# Patient Record
Sex: MALE | Race: OTHER
[De-identification: names, ages, dates, MRNs, and addresses within clinical notes are randomized per-mention and may not be internally consistent; named-entity substitution may affect disease eponyms.]

---

## 2019-12-25 ENCOUNTER — HOSPITAL ENCOUNTER (EMERGENCY)
Dept: HOSPITAL 12 - ER | Age: 79
Discharge: HOME | End: 2019-12-25
Payer: MEDICARE

## 2019-12-25 VITALS — HEIGHT: 70 IN | BODY MASS INDEX: 24.77 KG/M2 | WEIGHT: 173 LBS

## 2019-12-25 VITALS — DIASTOLIC BLOOD PRESSURE: 79 MMHG | SYSTOLIC BLOOD PRESSURE: 141 MMHG

## 2019-12-25 DIAGNOSIS — I10: ICD-10-CM

## 2019-12-25 DIAGNOSIS — N40.1: Primary | ICD-10-CM

## 2019-12-25 DIAGNOSIS — E78.5: ICD-10-CM

## 2019-12-25 DIAGNOSIS — R39.15: ICD-10-CM

## 2019-12-25 DIAGNOSIS — R31.9: ICD-10-CM

## 2019-12-25 DIAGNOSIS — Z90.49: ICD-10-CM

## 2019-12-25 LAB
APPEARANCE UR: (no result)
BASOPHILS # BLD AUTO: 0 K/UL (ref 0–8)
BASOPHILS NFR BLD AUTO: 0.2 % (ref 0–2)
BILIRUB UR QL STRIP: NEGATIVE
BUN SERPL-MCNC: 8 MG/DL (ref 7–18)
CHLORIDE SERPL-SCNC: 97 MMOL/L (ref 98–107)
CO2 SERPL-SCNC: 29 MMOL/L (ref 21–32)
COLOR UR: (no result)
CREAT SERPL-MCNC: 0.9 MG/DL (ref 0.6–1.3)
EOSINOPHIL # BLD AUTO: 0.2 K/UL (ref 0–0.7)
EOSINOPHIL NFR BLD AUTO: 2 % (ref 0–7)
GLUCOSE SERPL-MCNC: 107 MG/DL (ref 74–106)
GLUCOSE UR STRIP-MCNC: NEGATIVE MG/DL
HCT VFR BLD AUTO: 46.6 % (ref 36.7–47.1)
HGB BLD-MCNC: 15.7 G/DL (ref 12.5–16.3)
HGB UR QL STRIP: (no result)
KETONES UR STRIP-MCNC: NEGATIVE MG/DL
LEUKOCYTE ESTERASE UR QL STRIP: NEGATIVE
LYMPHOCYTES # BLD AUTO: 0.7 K/UL (ref 20–40)
LYMPHOCYTES NFR BLD AUTO: 7 % (ref 20.5–51.5)
MCH RBC QN AUTO: 29.4 UUG (ref 23.8–33.4)
MCHC RBC AUTO-ENTMCNC: 34 G/DL (ref 32.5–36.3)
MCV RBC AUTO: 87.4 FL (ref 73–96.2)
MONOCYTES # BLD AUTO: 0.7 K/UL (ref 2–10)
MONOCYTES NFR BLD AUTO: 7.2 % (ref 0–11)
NEUTROPHILS # BLD AUTO: 7.8 K/UL (ref 1.8–8.9)
NEUTROPHILS NFR BLD AUTO: 83.6 % (ref 38.5–71.5)
NITRITE UR QL STRIP: NEGATIVE
PH UR STRIP: 7 [PH] (ref 5–8)
PLATELET # BLD AUTO: 189 K/UL (ref 152–348)
POTASSIUM SERPL-SCNC: 4 MMOL/L (ref 3.5–5.1)
RBC # BLD AUTO: 5.34 MIL/UL (ref 4.06–5.63)
RBC #/AREA URNS HPF: (no result) /HPF (ref 0–3)
SP GR UR STRIP: 1.01 (ref 1–1.03)
UROBILINOGEN UR STRIP-MCNC: 0.2 E.U./DL
WBC # BLD AUTO: 9.3 K/UL (ref 3.6–10.2)
WBC #/AREA URNS HPF: (no result) /HPF (ref 0–3)

## 2019-12-25 PROCEDURE — A4663 DIALYSIS BLOOD PRESSURE CUFF: HCPCS

## 2019-12-25 PROCEDURE — A9150 MISC/EXPER NON-PRESCRIPT DRU: HCPCS

## 2019-12-25 NOTE — NUR
Patient discharged to home in stable conditon.  Written and verbal after care 
instructions given. 

Patient verbalizes understanding of instructions.copy of all the images 
provided for pt and daughter and pt and daughter understand that the pt needs 
urology follow up. translation provided by me from farsi to english. pt walks i 
nsteady gait. pt deneis any pressure abdomial area.

## 2019-12-25 NOTE — NUR
Pt was evaluated by dr Landers. pt was d/c'd to home. d/c instructions given to 
the pt by dr Landers.

## 2022-07-28 ENCOUNTER — HOSPITAL ENCOUNTER (INPATIENT)
Dept: HOSPITAL 54 - ER | Age: 82
LOS: 1 days | Discharge: LEFT BEFORE DISCHARGE | DRG: 178 | End: 2022-07-29
Attending: INTERNAL MEDICINE | Admitting: NURSE PRACTITIONER
Payer: MEDICARE

## 2022-07-28 VITALS — BODY MASS INDEX: 21.47 KG/M2 | WEIGHT: 150 LBS | HEIGHT: 70 IN

## 2022-07-28 DIAGNOSIS — U07.1: Primary | ICD-10-CM

## 2022-07-28 DIAGNOSIS — E88.09: ICD-10-CM

## 2022-07-28 DIAGNOSIS — E83.51: ICD-10-CM

## 2022-07-28 DIAGNOSIS — I10: ICD-10-CM

## 2022-07-28 DIAGNOSIS — I95.9: ICD-10-CM

## 2022-07-28 DIAGNOSIS — E46: ICD-10-CM

## 2022-07-28 DIAGNOSIS — E86.0: ICD-10-CM

## 2022-07-28 DIAGNOSIS — E87.1: ICD-10-CM

## 2022-07-28 LAB
ALBUMIN SERPL BCP-MCNC: 3.2 G/DL (ref 3.4–5)
ALP SERPL-CCNC: 64 U/L (ref 46–116)
ALT SERPL W P-5'-P-CCNC: 9 U/L (ref 12–78)
AST SERPL W P-5'-P-CCNC: 19 U/L (ref 15–37)
BASOPHILS # BLD AUTO: 0 K/UL (ref 0–0.2)
BASOPHILS NFR BLD AUTO: 0.4 % (ref 0–2)
BILIRUB DIRECT SERPL-MCNC: 0.1 MG/DL (ref 0–0.2)
BILIRUB SERPL-MCNC: 0.4 MG/DL (ref 0.2–1)
BILIRUB UR QL STRIP: NEGATIVE
BUN SERPL-MCNC: 14 MG/DL (ref 7–18)
CALCIUM SERPL-MCNC: 7.5 MG/DL (ref 8.5–10.1)
CHLORIDE SERPL-SCNC: 100 MMOL/L (ref 98–107)
CO2 SERPL-SCNC: 30 MMOL/L (ref 21–32)
COLOR UR: YELLOW
CREAT SERPL-MCNC: 0.9 MG/DL (ref 0.6–1.3)
EOSINOPHIL NFR BLD AUTO: 0.3 % (ref 0–6)
GLUCOSE SERPL-MCNC: 101 MG/DL (ref 74–106)
GLUCOSE UR STRIP-MCNC: NEGATIVE MG/DL
HCT VFR BLD AUTO: 36 % (ref 39–51)
HGB BLD-MCNC: 12.1 G/DL (ref 13.5–17.5)
LEUKOCYTE ESTERASE UR QL STRIP: NEGATIVE
LYMPHOCYTES NFR BLD AUTO: 0.6 K/UL (ref 0.8–4.8)
LYMPHOCYTES NFR BLD AUTO: 19.2 % (ref 20–44)
LYMPHOCYTES NFR BLD MANUAL: 15 % (ref 16–48)
MCHC RBC AUTO-ENTMCNC: 33 G/DL (ref 31–36)
MCV RBC AUTO: 87 FL (ref 80–96)
MONOCYTES NFR BLD AUTO: 0.6 K/UL (ref 0.1–1.3)
MONOCYTES NFR BLD AUTO: 16.9 % (ref 2–12)
MONOCYTES NFR BLD MANUAL: 14 % (ref 0–11)
NEUTROPHILS # BLD AUTO: 2.1 K/UL (ref 1.8–8.9)
NEUTROPHILS NFR BLD AUTO: 63.2 % (ref 43–81)
NEUTS BAND NFR BLD MANUAL: 2 % (ref 0–5)
NEUTS SEG NFR BLD MANUAL: 69 % (ref 42–76)
NITRITE UR QL STRIP: NEGATIVE
PH UR STRIP: 5.5 [PH] (ref 5–8)
PLATELET # BLD AUTO: 171 K/UL (ref 150–450)
POTASSIUM SERPL-SCNC: 3.9 MMOL/L (ref 3.5–5.1)
PROT SERPL-MCNC: 6.4 G/DL (ref 6.4–8.2)
PROT UR QL STRIP: NEGATIVE MG/DL
RBC # BLD AUTO: 4.18 MIL/UL (ref 4.5–6)
SODIUM SERPL-SCNC: 132 MMOL/L (ref 136–145)
UROBILINOGEN UR STRIP-MCNC: 0.2 EU/DL
WBC NRBC COR # BLD AUTO: 3.4 K/UL (ref 4.3–11)

## 2022-07-28 PROCEDURE — C9803 HOPD COVID-19 SPEC COLLECT: HCPCS

## 2022-07-28 PROCEDURE — G0378 HOSPITAL OBSERVATION PER HR: HCPCS

## 2022-07-28 NOTE — NUR
TO ER BED 12. BIBRA 93 FROM HOME C/O GEN WEAKNESS PER FAMILY X 2 DAYS . PT 
TESTED COVID ANURAG 2 DAYS AGO BUT FAMILY IS POSITIVE. BREATHING IS EVEN AND 
NONLABORED. CONNECTED TO MONITOR. AWAITING MD QUINTANILLA

## 2022-07-29 VITALS — DIASTOLIC BLOOD PRESSURE: 53 MMHG | SYSTOLIC BLOOD PRESSURE: 113 MMHG

## 2022-07-29 VITALS — DIASTOLIC BLOOD PRESSURE: 55 MMHG | SYSTOLIC BLOOD PRESSURE: 110 MMHG

## 2022-07-29 VITALS — DIASTOLIC BLOOD PRESSURE: 55 MMHG | SYSTOLIC BLOOD PRESSURE: 112 MMHG

## 2022-07-29 VITALS — SYSTOLIC BLOOD PRESSURE: 110 MMHG | DIASTOLIC BLOOD PRESSURE: 59 MMHG

## 2022-07-29 VITALS — SYSTOLIC BLOOD PRESSURE: 96 MMHG | DIASTOLIC BLOOD PRESSURE: 63 MMHG

## 2022-07-29 VITALS — SYSTOLIC BLOOD PRESSURE: 113 MMHG | DIASTOLIC BLOOD PRESSURE: 60 MMHG

## 2022-07-29 LAB
ALBUMIN SERPL BCP-MCNC: 2.9 G/DL (ref 3.4–5)
ALP SERPL-CCNC: 58 U/L (ref 46–116)
ALT SERPL W P-5'-P-CCNC: 21 U/L (ref 12–78)
AST SERPL W P-5'-P-CCNC: 19 U/L (ref 15–37)
BASOPHILS # BLD AUTO: 0 K/UL (ref 0–0.2)
BASOPHILS NFR BLD AUTO: 0.5 % (ref 0–2)
BILIRUB SERPL-MCNC: 0.3 MG/DL (ref 0.2–1)
BUN SERPL-MCNC: 13 MG/DL (ref 7–18)
CALCIUM SERPL-MCNC: 7.8 MG/DL (ref 8.5–10.1)
CHLORIDE SERPL-SCNC: 101 MMOL/L (ref 98–107)
CO2 SERPL-SCNC: 25 MMOL/L (ref 21–32)
CREAT SERPL-MCNC: 0.9 MG/DL (ref 0.6–1.3)
EOSINOPHIL NFR BLD AUTO: 0 % (ref 0–6)
GLUCOSE SERPL-MCNC: 114 MG/DL (ref 74–106)
HCT VFR BLD AUTO: 35 % (ref 39–51)
HGB BLD-MCNC: 11.6 G/DL (ref 13.5–17.5)
LYMPHOCYTES NFR BLD AUTO: 0.8 K/UL (ref 0.8–4.8)
LYMPHOCYTES NFR BLD AUTO: 28.1 % (ref 20–44)
LYMPHOCYTES NFR BLD MANUAL: 27 % (ref 16–48)
MCHC RBC AUTO-ENTMCNC: 33 G/DL (ref 31–36)
MCV RBC AUTO: 88 FL (ref 80–96)
MONOCYTES NFR BLD AUTO: 0.5 K/UL (ref 0.1–1.3)
MONOCYTES NFR BLD AUTO: 18.9 % (ref 2–12)
MONOCYTES NFR BLD MANUAL: 16 % (ref 0–11)
NEUTROPHILS # BLD AUTO: 1.4 K/UL (ref 1.8–8.9)
NEUTROPHILS NFR BLD AUTO: 52.5 % (ref 43–81)
NEUTS BAND NFR BLD MANUAL: 4 % (ref 0–5)
NEUTS SEG NFR BLD MANUAL: 53 % (ref 42–76)
PLATELET # BLD AUTO: 154 K/UL (ref 150–450)
POTASSIUM SERPL-SCNC: 3.6 MMOL/L (ref 3.5–5.1)
PROT SERPL-MCNC: 5.9 G/DL (ref 6.4–8.2)
RBC # BLD AUTO: 4 MIL/UL (ref 4.5–6)
SODIUM SERPL-SCNC: 134 MMOL/L (ref 136–145)
WBC #/AREA URNS HPF: (no result) /HPF (ref 0–3)
WBC NRBC COR # BLD AUTO: 2.7 K/UL (ref 4.3–11)

## 2022-07-29 RX ADMIN — SODIUM CHLORIDE PRN MLS/HR: 9 INJECTION, SOLUTION INTRAVENOUS at 15:40

## 2022-07-29 RX ADMIN — SODIUM CHLORIDE PRN MLS/HR: 9 INJECTION, SOLUTION INTRAVENOUS at 05:18

## 2022-07-29 NOTE — NUR
TELE RN NOTES 

PT IN BED ASLEEP, EASILY AROUSABLE. A/O X 3. ON RA. LISA MIDLINE INFUSING NS AT 90ML/HR. 
TOLERATING WELL. AMBULATORY W/ SITTER, SAFETY MEASURES IN PLACE. CALL LIGHT WITHIN REACH. 
WILL CONTINUE PLAN OF CARE.

## 2022-07-29 NOTE — NUR
TELE RN NOTES 

PT IN BED ASLEEP, EASILY AROUSABLE. A/O X 3. ON RA. TOLERATING WELL. LISA MIDLINE REMOVED. 
CATHETER INTACT. PT IS AMBULATORY W/ SITTER, SAFETY MEASURES IN PLACE. CALL LIGHT WITHIN 
REACH. CM SPOKE WITH DAUGHTER. FAMILY DECIDED FOR PT TO GO AMA TONIGHT. EXPLAINED WHY PT 
NEEDS TO STAY IN THE HOSPITAL, STILL WANTS PT TO GO HOME. DISCHARGE PAPERS PREPARED. WILL 
ENDORSE TO NEXT NURSE TO DUTY.

## 2022-07-29 NOTE — NUR
RN NOTES





RECEIVED REPORT FROM MORNING RN. PATIENT IN BED A/O X1 WITH PERIODS OF CONFUSION. NO SOB NO 
DISTRESS NOTED AT THIS TIME. ON ROOM AIR SATING 99%. SITTER AT OUTSIDE CHECKING THE PATIENT. 
ALL ISOLATION PRECAUTION RENDERED AT ALL TIMES. ALL SAFETY MEASURES IN PLACE AT ALL TIMES. 
HOB ELEVATED. BED ON LOWEST POSITION AND LOCKED. WILL CLOSELY MONITOR THE PATIENT.

## 2022-07-29 NOTE — NUR
TELE RN NOTES 

ADMITTED  A 81 Y/O  MALE  A/O X3  [place] SPEAKING  ABLE TO MAKE  NEEDS KNOWN  ADMITTED  
WITH DX OF  COVID 19/ HYPOTENSION , PTS ON  SITTER 1:1 D/T PTS KEPT  GETTING OUT OF  BED . 
ON ISOLATION  PRECAUTION  MAINTAIN AT ALL TIMES . PTS ON RIGHT AC  g#20  IVF OF  NS AT 
90CC/HR  INFUSING WELL .PTS  NOTED WITH INTACT  SKIN  NKDA  AND FULL CODE  PTS ON R/A  
SATING  97% NO SOB NO DISTRESS NOTED  ALL NEEDS ATTENDED TOO CALL LIGHT WITHIN REACH  KEPT 
PTS  CLEAN  DRY AND COMFORTABLE .

## 2022-07-29 NOTE — NUR
PATIENT HAS BEEN REMOVING HIS TELEMETRY MONITOR 5 TIMES ALREADY.  DR. ELSA PIERRE 
ORDERED TO D/C MONITOR

## 2022-07-29 NOTE — NUR
TELE RN NOTES 

PTS REMAIN IN BED AWAKE ALERT AND  RESPONSIVE , PTS  KEPT  GETTING OUT OF  BED  ORDER FOR 
SITTER , PRECAUTIONARY  MEASURES MAINTAIN AT ALL TIMES  ENDORSE TO  MORNING SHIFT TO FOLLOW  
MEDICATION LIST FROM  HOME ALL NEEDS ATTENDED TOO.V/S STABLE  AFEBRILE.

## 2022-07-29 NOTE — NUR
SPOKE WITH PT'S DAUGHTER. PT'S DAUGHTER DICTATED PT'S LIST OF MEDICINES. CALLED AND GAVE MED 
LIST TO MED RECON NURSE.

## 2022-07-29 NOTE — NUR
RN NOTES





DAUGHTER CAME IN AND SIGNS AMA. RISK AND BENEFITS EXPLAINED TO THE DAUGHTER. STILL INSISTING 
AMA. BELONGING LIST ACCOUNTED AND CHECKED AND SIGNED BY DAUGHTER. PATIENT REMAINS STABLE NO 
SIGNIFICANT CHANGES. WHEEL OUTSIDE BY THE CNA WITH DAUGHTER.

## 2024-08-01 ENCOUNTER — HOSPITAL ENCOUNTER (INPATIENT)
Dept: HOSPITAL 54 - ER | Age: 84
LOS: 3 days | Discharge: HOME | DRG: 885 | End: 2024-08-04
Attending: NURSE PRACTITIONER | Admitting: PSYCHIATRY & NEUROLOGY
Payer: MEDICARE

## 2024-08-01 VITALS — DIASTOLIC BLOOD PRESSURE: 69 MMHG | TEMPERATURE: 98.6 F | OXYGEN SATURATION: 97 % | SYSTOLIC BLOOD PRESSURE: 128 MMHG

## 2024-08-01 VITALS — SYSTOLIC BLOOD PRESSURE: 127 MMHG | DIASTOLIC BLOOD PRESSURE: 62 MMHG | TEMPERATURE: 97.7 F | OXYGEN SATURATION: 94 %

## 2024-08-01 VITALS — TEMPERATURE: 98 F | OXYGEN SATURATION: 100 % | SYSTOLIC BLOOD PRESSURE: 118 MMHG | DIASTOLIC BLOOD PRESSURE: 81 MMHG

## 2024-08-01 VITALS — WEIGHT: 151 LBS | HEIGHT: 63 IN | BODY MASS INDEX: 26.75 KG/M2

## 2024-08-01 VITALS — SYSTOLIC BLOOD PRESSURE: 162 MMHG | OXYGEN SATURATION: 97 % | DIASTOLIC BLOOD PRESSURE: 76 MMHG | TEMPERATURE: 97.7 F

## 2024-08-01 DIAGNOSIS — Z73.6: ICD-10-CM

## 2024-08-01 DIAGNOSIS — F29: ICD-10-CM

## 2024-08-01 DIAGNOSIS — F03.911: ICD-10-CM

## 2024-08-01 DIAGNOSIS — R53.1: ICD-10-CM

## 2024-08-01 DIAGNOSIS — E03.9: ICD-10-CM

## 2024-08-01 DIAGNOSIS — Z79.02: ICD-10-CM

## 2024-08-01 DIAGNOSIS — I10: ICD-10-CM

## 2024-08-01 DIAGNOSIS — Z86.718: ICD-10-CM

## 2024-08-01 DIAGNOSIS — F32.A: ICD-10-CM

## 2024-08-01 DIAGNOSIS — E78.5: ICD-10-CM

## 2024-08-01 DIAGNOSIS — E88.09: ICD-10-CM

## 2024-08-01 DIAGNOSIS — Z79.899: ICD-10-CM

## 2024-08-01 DIAGNOSIS — F39: Primary | ICD-10-CM

## 2024-08-01 DIAGNOSIS — F03.90: ICD-10-CM

## 2024-08-01 DIAGNOSIS — E44.1: ICD-10-CM

## 2024-08-01 DIAGNOSIS — N40.0: ICD-10-CM

## 2024-08-01 DIAGNOSIS — R79.89: ICD-10-CM

## 2024-08-01 DIAGNOSIS — E86.0: ICD-10-CM

## 2024-08-01 DIAGNOSIS — F03.93: ICD-10-CM

## 2024-08-01 DIAGNOSIS — Z91.128: ICD-10-CM

## 2024-08-01 DIAGNOSIS — D63.8: ICD-10-CM

## 2024-08-01 LAB
ALBUMIN SERPL BCP-MCNC: 3.1 G/DL (ref 3.4–5)
ALBUMIN SERPL BCP-MCNC: 3.3 G/DL (ref 3.4–5)
ALP SERPL-CCNC: 69 U/L (ref 46–116)
ALP SERPL-CCNC: 76 U/L (ref 46–116)
ALT SERPL W P-5'-P-CCNC: 19 U/L (ref 12–78)
ALT SERPL W P-5'-P-CCNC: 22 U/L (ref 12–78)
AMPHETAMINES UR QL: NEGATIVE
APAP SERPL-MCNC: <10 UG/ML (ref 10–30)
APPEARANCE UR: CLEAR
AST SERPL W P-5'-P-CCNC: 10 U/L (ref 15–37)
AST SERPL W P-5'-P-CCNC: 6 U/L (ref 15–37)
BACTERIA UR CULT: NO
BARBITURATES UR QL SCN: NEGATIVE
BASOPHILS # BLD AUTO: 0 K/UL (ref 0–0.2)
BASOPHILS NFR BLD AUTO: 0.7 % (ref 0–2)
BENZODIAZ UR QL SCN: NEGATIVE
BILIRUB DIRECT SERPL-MCNC: 0.1 MG/DL (ref 0–0.2)
BILIRUB SERPL-MCNC: 0.5 MG/DL (ref 0.2–1)
BILIRUB SERPL-MCNC: 0.5 MG/DL (ref 0.2–1)
BILIRUB UR QL STRIP: NEGATIVE
BUN SERPL-MCNC: 19 MG/DL (ref 7–18)
BUN SERPL-MCNC: 19 MG/DL (ref 7–18)
CALCIUM SERPL-MCNC: 8.7 MG/DL (ref 8.5–10.1)
CALCIUM SERPL-MCNC: 9 MG/DL (ref 8.5–10.1)
CANNABINOIDS UR QL SCN: NEGATIVE
CHLORIDE SERPL-SCNC: 106 MMOL/L (ref 98–107)
CHLORIDE SERPL-SCNC: 108 MMOL/L (ref 98–107)
CHOLEST SERPL-MCNC: 151 MG/DL (ref ?–200)
CO2 SERPL-SCNC: 27 MMOL/L (ref 21–32)
CO2 SERPL-SCNC: 28 MMOL/L (ref 21–32)
COCAINE UR QL SCN: NEGATIVE
COLOR UR: YELLOW
CREAT SERPL-MCNC: 0.7 MG/DL (ref 0.6–1.3)
CREAT SERPL-MCNC: 0.9 MG/DL (ref 0.6–1.3)
EOSINOPHIL # BLD AUTO: 0.1 K/UL (ref 0–0.7)
EOSINOPHIL NFR BLD AUTO: 1.7 % (ref 0–6)
ERYTHROCYTE [DISTWIDTH] IN BLOOD BY AUTOMATED COUNT: 13.1 % (ref 11.5–15)
ETHANOL SERPL-MCNC: < 3 MG/DL (ref 0–10)
GLUCOSE SERPL-MCNC: 113 MG/DL (ref 74–106)
GLUCOSE SERPL-MCNC: 93 MG/DL (ref 74–106)
GLUCOSE UR STRIP-MCNC: NEGATIVE MG/DL
HCT VFR BLD AUTO: 39 % (ref 39–51)
HDLC SERPL-MCNC: 78 MG/DL (ref 40–60)
HGB BLD-MCNC: 13 G/DL (ref 13.5–17.5)
HGB UR QL STRIP: (no result) ERY/UL
KETONES UR STRIP-MCNC: (no result) MG/DL
LDLC SERPL DIRECT ASSAY-MCNC: 59 MG/DL (ref 0–99)
LEUKOCYTE ESTERASE UR QL STRIP: NEGATIVE
LYMPHOCYTES NFR BLD AUTO: 2 K/UL (ref 0.8–4.8)
LYMPHOCYTES NFR BLD AUTO: 31.4 % (ref 20–44)
MCH RBC QN AUTO: 29 PG (ref 26–33)
MCHC RBC AUTO-ENTMCNC: 34 G/DL (ref 31–36)
MCV RBC AUTO: 86 FL (ref 80–96)
MONOCYTES NFR BLD AUTO: 0.6 K/UL (ref 0.1–1.3)
MONOCYTES NFR BLD AUTO: 10 % (ref 2–12)
NEUTROPHILS # BLD AUTO: 3.6 K/UL (ref 1.8–8.9)
NEUTROPHILS NFR BLD AUTO: 56.2 % (ref 43–81)
NITRITE UR QL STRIP: NEGATIVE
OPIATES UR QL SCN: NEGATIVE
PCP UR QL SCN: NEGATIVE
PH UR STRIP: 6 [PH] (ref 5–8)
PLATELET # BLD AUTO: 192 K/UL (ref 150–450)
POTASSIUM SERPL-SCNC: 3.4 MMOL/L (ref 3.5–5.1)
POTASSIUM SERPL-SCNC: 3.6 MMOL/L (ref 3.5–5.1)
PROT SERPL-MCNC: 6.6 G/DL (ref 6.4–8.2)
PROT SERPL-MCNC: 6.6 G/DL (ref 6.4–8.2)
PROT UR QL STRIP: NEGATIVE MG/DL
RBC # BLD AUTO: 4.5 MIL/UL (ref 4.5–6)
RBC #/AREA URNS HPF: (no result) /HPF (ref 0–2)
SALICYLATES SERPL-MCNC: 0.2 MG/DL (ref 2.8–20)
SODIUM SERPL-SCNC: 142 MMOL/L (ref 136–145)
SODIUM SERPL-SCNC: 145 MMOL/L (ref 136–145)
SP GR UR STRIP: 1.02 (ref 1–1.03)
TRIGL SERPL-MCNC: 55 MG/DL (ref 30–150)
UROBILINOGEN UR STRIP-MCNC: 0.2 EU/DL
WBC #/AREA URNS HPF: (no result) /HPF (ref 0–3)
WBC NRBC COR # BLD AUTO: 6.4 K/UL (ref 4.3–11)

## 2024-08-01 PROCEDURE — G0480 DRUG TEST DEF 1-7 CLASSES: HCPCS

## 2024-08-01 RX ADMIN — Medication ONE EACH: at 06:43

## 2024-08-01 RX ADMIN — POTASSIUM CHLORIDE SCH MEQ: 1500 TABLET, EXTENDED RELEASE ORAL at 10:59

## 2024-08-01 RX ADMIN — AMANTADINE HYDROCHLORIDE SCH MG: 100 CAPSULE, GELATIN COATED ORAL at 17:17

## 2024-08-01 RX ADMIN — MEMANTINE HYDROCHLORIDE SCH MG: 5 TABLET ORAL at 20:06

## 2024-08-01 RX ADMIN — METOPROLOL SUCCINATE SCH MG: 25 TABLET, EXTENDED RELEASE ORAL at 09:36

## 2024-08-01 RX ADMIN — SERTRALINE HYDROCHLORIDE SCH MG: 25 TABLET, FILM COATED ORAL at 16:16

## 2024-08-01 RX ADMIN — Medication SCH UDTAB: at 17:17

## 2024-08-02 VITALS — OXYGEN SATURATION: 99 % | TEMPERATURE: 97.8 F | SYSTOLIC BLOOD PRESSURE: 114 MMHG | DIASTOLIC BLOOD PRESSURE: 80 MMHG

## 2024-08-02 VITALS — OXYGEN SATURATION: 96 % | DIASTOLIC BLOOD PRESSURE: 61 MMHG | SYSTOLIC BLOOD PRESSURE: 111 MMHG | TEMPERATURE: 98 F

## 2024-08-02 LAB
BUN SERPL-MCNC: 13 MG/DL (ref 7–18)
CALCIUM SERPL-MCNC: 8.5 MG/DL (ref 8.5–10.1)
CHLORIDE SERPL-SCNC: 109 MMOL/L (ref 98–107)
CO2 SERPL-SCNC: 27 MMOL/L (ref 21–32)
CREAT SERPL-MCNC: 0.6 MG/DL (ref 0.6–1.3)
GLUCOSE SERPL-MCNC: 93 MG/DL (ref 74–106)
POTASSIUM SERPL-SCNC: 3.5 MMOL/L (ref 3.5–5.1)
SODIUM SERPL-SCNC: 143 MMOL/L (ref 136–145)

## 2024-08-02 RX ADMIN — FINASTERIDE SCH MG: 5 TABLET, FILM COATED ORAL at 09:07

## 2024-08-02 RX ADMIN — MAGNESIUM OXIDE TAB 400 MG (241.3 MG ELEMENTAL MG) SCH MG: 400 (241.3 MG) TAB at 09:06

## 2024-08-02 RX ADMIN — LORAZEPAM PRN MG: 0.5 TABLET ORAL at 05:13

## 2024-08-02 RX ADMIN — CLOPIDOGREL BISULFATE SCH MG: 75 TABLET, FILM COATED ORAL at 09:12

## 2024-08-02 RX ADMIN — ATORVASTATIN CALCIUM SCH MG: 40 TABLET, FILM COATED ORAL at 09:07

## 2024-08-02 RX ADMIN — DONEPEZIL HYDROCHLORIDE SCH MG: 5 TABLET ORAL at 09:07

## 2024-08-03 VITALS — SYSTOLIC BLOOD PRESSURE: 119 MMHG | TEMPERATURE: 98 F | OXYGEN SATURATION: 100 % | DIASTOLIC BLOOD PRESSURE: 69 MMHG

## 2024-08-03 VITALS — DIASTOLIC BLOOD PRESSURE: 63 MMHG | SYSTOLIC BLOOD PRESSURE: 114 MMHG | OXYGEN SATURATION: 100 % | TEMPERATURE: 98.3 F

## 2024-08-03 VITALS — DIASTOLIC BLOOD PRESSURE: 68 MMHG | TEMPERATURE: 97.7 F | SYSTOLIC BLOOD PRESSURE: 115 MMHG | OXYGEN SATURATION: 98 %

## 2024-08-03 RX ADMIN — SERTRALINE HYDROCHLORIDE SCH MG: 50 TABLET, FILM COATED ORAL at 16:23

## 2024-08-04 VITALS — SYSTOLIC BLOOD PRESSURE: 123 MMHG | DIASTOLIC BLOOD PRESSURE: 64 MMHG

## 2025-06-07 ENCOUNTER — HOSPITAL ENCOUNTER (INPATIENT)
Dept: HOSPITAL 12 - ER | Age: 85
LOS: 6 days | Discharge: HOME | DRG: 885 | End: 2025-06-13
Payer: MEDICARE

## 2025-06-07 VITALS — HEIGHT: 65 IN | WEIGHT: 150 LBS | BODY MASS INDEX: 24.99 KG/M2

## 2025-06-07 DIAGNOSIS — Z90.49: ICD-10-CM

## 2025-06-07 DIAGNOSIS — Z60.3: ICD-10-CM

## 2025-06-07 DIAGNOSIS — Z79.82: ICD-10-CM

## 2025-06-07 DIAGNOSIS — Z79.899: ICD-10-CM

## 2025-06-07 DIAGNOSIS — E78.5: ICD-10-CM

## 2025-06-07 DIAGNOSIS — F02.84: ICD-10-CM

## 2025-06-07 DIAGNOSIS — Z79.02: ICD-10-CM

## 2025-06-07 DIAGNOSIS — F02.83: ICD-10-CM

## 2025-06-07 DIAGNOSIS — N40.0: ICD-10-CM

## 2025-06-07 DIAGNOSIS — E44.1: ICD-10-CM

## 2025-06-07 DIAGNOSIS — G31.83: ICD-10-CM

## 2025-06-07 DIAGNOSIS — F02.811: ICD-10-CM

## 2025-06-07 DIAGNOSIS — I10: ICD-10-CM

## 2025-06-07 DIAGNOSIS — F29: Primary | ICD-10-CM

## 2025-06-07 DIAGNOSIS — R45.851: ICD-10-CM

## 2025-06-07 DIAGNOSIS — E88.09: ICD-10-CM

## 2025-06-07 LAB
ALBUMIN SERPL BCG-MCNC: 3.2 G/DL (ref 3.4–5)
ALP SERPL-CCNC: 65 U/L (ref 50–136)
ALT SERPL W/O P-5'-P-CCNC: 29 U/L (ref 16–63)
AMORPH PHOS CRY URNS QL MICRO: (no result) /HPF
AMORPH URATE CRY URNS QL MICRO: (no result) /HPF
AMPHETAMINES UR QL SCN>1000 NG/ML: NEGATIVE
ANISOCYTOSIS BLD QL: (no result)
APAP SERPL-MCNC: < 2 UG/ML (ref 10–30)
APPEARANCE UR: CLEAR
AST SERPL-CCNC: 16 U/L (ref 15–37)
AUER BODIES BLD QL SMEAR: (no result)
BARBITURATES UR QL SCN: NEGATIVE
BASO STIPL BLD QL SMEAR: (no result)
BASOPHILS # BLD AUTO: 0.1 K/UL (ref 0–0.2)
BASOPHILS NFR BLD AUTO: 0.8 % (ref 0–2)
BENZODIAZ UR QL SCN: NEGATIVE
BILIRUB DIRECT SERPL-MCNC: 0.2 MG/DL (ref 0–0.2)
BILIRUB SERPL-MCNC: 0.4 MG/DL (ref 0.2–1)
BILIRUB UR QL STRIP: NEGATIVE
BUN SERPL-MCNC: 20 MG/DL (ref 7–18)
CA CARBONATE CRY #/AREA URNS HPF: (no result) /HPF
CA PHOS CRY URNS QL MICRO: (no result) /HPF
CABOT RINGS BLD QL SMEAR: (no result)
CALCIUM SERPL-MCNC: 9 MG/DL (ref 8.5–10.1)
CAOX CRY URNS QL MICRO: (no result) /HPF
CASTS URNS QL MICRO: (no result) /LPF
CHLORIDE SERPL-SCNC: 103 MMOL/L (ref 98–107)
CO2 SERPL-SCNC: 26 MMOL/L (ref 21–32)
COARSE GRAN CASTS URNS QL MICRO: (no result) /LPF
COCAINE UR QL SCN: NEGATIVE
COLOR UR: YELLOW
CREAT SERPL-MCNC: 1 MG/DL (ref 0.6–1.3)
CYSTINE CRY #/AREA URNS HPF: (no result) /HPF
DACRYOCYTES BLD QL SMEAR: (no result)
DEPRECATED SQUAMOUS URNS QL MICRO: (no result) /HPF
DOHLE BOD BLD QL SMEAR: (no result)
EOSINOPHIL # BLD AUTO: 0.2 K/UL (ref 0–0.7)
EOSINOPHIL NFR BLD AUTO: 2.5 % (ref 0–7)
ERYTHROCYTE [DISTWIDTH] IN BLOOD BY AUTOMATED COUNT: 13.4 % (ref 12.1–16.2)
ETHANOL SERPL-MCNC: < 3 MG/DL (ref 0–10)
FATTY CASTS URNS QL MICRO: (no result) /LPF
FENTANYL UR-MCNC: NEGATIVE NG/ML
GLUCOSE SERPL-MCNC: 113 MG/DL (ref 74–106)
GLUCOSE UR STRIP-MCNC: NEGATIVE MG/DL
HCT VFR BLD AUTO: 40.8 % (ref 36.7–47.1)
HELMET CELLS BLD QL SMEAR: (no result)
HGB BLD-MCNC: 13.8 G/DL (ref 12.5–16.3)
HGB UR QL STRIP: (no result)
HOWELL-JOLLY BOD BLD QL SMEAR: (no result)
HYPOCHROMIA BLD QL: (no result)
KETONES UR STRIP-MCNC: NEGATIVE MG/DL
LEUKOCYTE ESTERASE UR QL STRIP: NEGATIVE
LYMPHOCYTES # BLD AUTO: 2.1 K/UL (ref 0.8–4.8)
LYMPHOCYTES NFR BLD AUTO: 30 % (ref 20.5–51.5)
MACROCYTES BLD QL: (no result)
MANUAL DIF COMMENT BLD-IMP: 1
MCH RBC QN AUTO: 29.5 UUG (ref 23.8–33.4)
MCHC RBC AUTO-ENTMCNC: 34 G/DL (ref 32.5–36.3)
MCV RBC AUTO: 87.3 FL (ref 73–96.2)
MONOCYTES # BLD AUTO: 0.7 K/UL (ref 0.1–1.3)
MONOCYTES NFR BLD AUTO: 9.7 % (ref 0–11)
MUCOUS THREADS URNS QL MICRO: (no result) /LPF
NEUTROPHILS # BLD AUTO: 3.9 K/UL (ref 1.8–8.9)
NEUTROPHILS NFR BLD AUTO: 57 % (ref 38.5–71.5)
NEUTS HYPERSEG # BLD: (no result) 10*3/UL
NITRITE UR QL STRIP: NEGATIVE
OPIATES UR QL SCN: NEGATIVE
OVALOCYTES BLD QL SMEAR: (no result)
PAPPENHEIMER BOD BLD QL SMEAR: (no result)
PCP UR QL SCN>25 NG/ML: NEGATIVE
PH UR STRIP: 6 [PH] (ref 5–8)
PLATELET # BLD AUTO: 184 K/UL (ref 152–348)
POTASSIUM SERPL-SCNC: 4 MMOL/L (ref 3.5–5.1)
PROT UR QL STRIP: NEGATIVE
RBC # BLD AUTO: 4.68 MIL/UL (ref 4.06–5.63)
RBC #/AREA URNS HPF: (no result) /HPF (ref 0–3)
RBC CASTS URNS QL MICRO: (no result) /LPF
ROULEAUX BLD QL SMEAR: (no result)
SMUDGE CELLS BLD QL SMEAR: (no result)
SODIUM SERPL-SCNC: 140 MMOL/L (ref 136–145)
SP GR UR STRIP: 1.02 (ref 1–1.03)
SPERM URNS QL MICRO: (no result) /HPF
STOMATOCYTES BLD QL SMEAR: (no result)
T VAGINALIS URNS QL MICRO: (no result) /HPF
TARGETS BLD QL SMEAR: (no result)
THC UR QL SCN>50 NG/ML: NEGATIVE
TOXIC GRANULES BLD QL SMEAR: (no result)
TRI-PHOS CRY URNS QL MICRO: (no result) /HPF
TYROSINE CRY URNS QL MICRO: (no result) /HPF
URATE CRY URNS QL MICRO: (no result) /HPF
UROBILINOGEN UR STRIP-MCNC: 0.2 E.U./DL
WAXY CASTS URNS QL MICRO: (no result) /LPF
WBC # BLD AUTO: 6.8 K/UL (ref 3.6–10.2)
WBC #/AREA URNS HPF: (no result) /HPF
WBC #/AREA URNS HPF: (no result) /HPF (ref 0–3)
WS STN SPEC: 6.6 G/DL (ref 6.4–8.2)
YEAST URNS QL MICRO: (no result) /HPF

## 2025-06-07 PROCEDURE — G0480 DRUG TEST DEF 1-7 CLASSES: HCPCS

## 2025-06-07 PROCEDURE — A4606 OXYGEN PROBE USED W OXIMETER: HCPCS

## 2025-06-07 PROCEDURE — A4663 DIALYSIS BLOOD PRESSURE CUFF: HCPCS

## 2025-06-07 SDOH — SOCIAL STABILITY - SOCIAL INSECURITY: ACCULTURATION DIFFICULTY: Z60.3

## 2025-06-08 VITALS — OXYGEN SATURATION: 97 % | TEMPERATURE: 98.1 F

## 2025-06-08 VITALS — OXYGEN SATURATION: 97 % | SYSTOLIC BLOOD PRESSURE: 135 MMHG | TEMPERATURE: 98 F | DIASTOLIC BLOOD PRESSURE: 70 MMHG

## 2025-06-08 VITALS — TEMPERATURE: 98.1 F | SYSTOLIC BLOOD PRESSURE: 126 MMHG | DIASTOLIC BLOOD PRESSURE: 57 MMHG | OXYGEN SATURATION: 97 %

## 2025-06-08 VITALS — SYSTOLIC BLOOD PRESSURE: 147 MMHG | OXYGEN SATURATION: 97 % | TEMPERATURE: 98.5 F | DIASTOLIC BLOOD PRESSURE: 63 MMHG

## 2025-06-08 RX ADMIN — MEMANTINE HYDROCHLORIDE SCH MG: 5 TABLET ORAL at 20:49

## 2025-06-08 RX ADMIN — Medication SCH MG: at 20:49

## 2025-06-08 RX ADMIN — QUETIAPINE SCH MG: 25 TABLET, FILM COATED ORAL at 20:48

## 2025-06-08 RX ADMIN — LORAZEPAM PRN MG: 0.5 TABLET ORAL at 03:43

## 2025-06-08 RX ADMIN — ATORVASTATIN CALCIUM SCH MG: 20 TABLET, FILM COATED ORAL at 20:49

## 2025-06-08 RX ADMIN — TRAZODONE HYDROCHLORIDE SCH MG: 50 TABLET ORAL at 20:49

## 2025-06-09 VITALS — SYSTOLIC BLOOD PRESSURE: 165 MMHG | TEMPERATURE: 97.7 F | DIASTOLIC BLOOD PRESSURE: 68 MMHG | OXYGEN SATURATION: 98 %

## 2025-06-09 VITALS — DIASTOLIC BLOOD PRESSURE: 57 MMHG | OXYGEN SATURATION: 97 % | TEMPERATURE: 98.1 F | SYSTOLIC BLOOD PRESSURE: 108 MMHG

## 2025-06-09 VITALS — DIASTOLIC BLOOD PRESSURE: 71 MMHG | TEMPERATURE: 98 F | SYSTOLIC BLOOD PRESSURE: 157 MMHG | OXYGEN SATURATION: 98 %

## 2025-06-09 LAB
CHOLEST SERPL-MCNC: 151 MG/DL (ref ?–200)
HDLC SERPL-MCNC: 68 MG/DL (ref 40–60)
TRIGL SERPL-MCNC: 108 MG/DL (ref 30–150)

## 2025-06-09 RX ADMIN — METOPROLOL SUCCINATE SCH MG: 23.75 TABLET, FILM COATED, EXTENDED RELEASE ORAL at 09:27

## 2025-06-09 RX ADMIN — VALSARTAN SCH MG: 160 TABLET ORAL at 09:27

## 2025-06-09 RX ADMIN — CLOPIDOGREL BISULFATE SCH MG: 75 TABLET ORAL at 09:26

## 2025-06-09 RX ADMIN — DONEPEZIL HYDROCHLORIDE SCH MG: 10 TABLET, FILM COATED ORAL at 09:26

## 2025-06-09 RX ADMIN — FINASTERIDE SCH MG: 5 TABLET, FILM COATED ORAL at 09:28

## 2025-06-10 VITALS — TEMPERATURE: 98.1 F | SYSTOLIC BLOOD PRESSURE: 138 MMHG | OXYGEN SATURATION: 98 % | DIASTOLIC BLOOD PRESSURE: 53 MMHG

## 2025-06-10 VITALS — TEMPERATURE: 98 F | SYSTOLIC BLOOD PRESSURE: 108 MMHG | DIASTOLIC BLOOD PRESSURE: 69 MMHG | OXYGEN SATURATION: 96 %

## 2025-06-10 RX ADMIN — TEMAZEPAM PRN MG: 7.5 CAPSULE ORAL at 23:57

## 2025-06-10 RX ADMIN — QUETIAPINE ONE MG: 25 TABLET, FILM COATED ORAL at 17:34

## 2025-06-11 VITALS — SYSTOLIC BLOOD PRESSURE: 130 MMHG | DIASTOLIC BLOOD PRESSURE: 68 MMHG | TEMPERATURE: 97.6 F | OXYGEN SATURATION: 98 %

## 2025-06-11 VITALS — TEMPERATURE: 97.4 F | OXYGEN SATURATION: 97 % | SYSTOLIC BLOOD PRESSURE: 125 MMHG | DIASTOLIC BLOOD PRESSURE: 72 MMHG

## 2025-06-11 VITALS — OXYGEN SATURATION: 96 % | SYSTOLIC BLOOD PRESSURE: 135 MMHG | DIASTOLIC BLOOD PRESSURE: 68 MMHG | TEMPERATURE: 97.2 F

## 2025-06-11 RX ADMIN — QUETIAPINE SCH MG: 25 TABLET, FILM COATED ORAL at 12:08

## 2025-06-12 VITALS — OXYGEN SATURATION: 98 % | SYSTOLIC BLOOD PRESSURE: 97 MMHG | TEMPERATURE: 98.2 F | DIASTOLIC BLOOD PRESSURE: 50 MMHG

## 2025-06-12 VITALS — OXYGEN SATURATION: 96 % | SYSTOLIC BLOOD PRESSURE: 144 MMHG | DIASTOLIC BLOOD PRESSURE: 66 MMHG | TEMPERATURE: 98.7 F

## 2025-06-12 VITALS — TEMPERATURE: 98 F | SYSTOLIC BLOOD PRESSURE: 101 MMHG | DIASTOLIC BLOOD PRESSURE: 89 MMHG | OXYGEN SATURATION: 98 %

## 2025-06-12 RX ADMIN — DOCUSATE SODIUM SCH MG: 100 CAPSULE, LIQUID FILLED ORAL at 16:57

## 2025-06-12 RX ADMIN — QUETIAPINE SCH MG: 25 TABLET, FILM COATED ORAL at 20:43

## 2025-06-12 RX ADMIN — LORAZEPAM PRN MG: 0.5 TABLET ORAL at 11:36

## 2025-06-13 VITALS — TEMPERATURE: 98 F | DIASTOLIC BLOOD PRESSURE: 49 MMHG | OXYGEN SATURATION: 98 % | SYSTOLIC BLOOD PRESSURE: 131 MMHG

## 2025-06-13 VITALS — TEMPERATURE: 98 F | SYSTOLIC BLOOD PRESSURE: 138 MMHG | DIASTOLIC BLOOD PRESSURE: 61 MMHG | OXYGEN SATURATION: 96 %

## 2025-06-13 LAB
ANISOCYTOSIS BLD QL: (no result)
AUER BODIES BLD QL SMEAR: (no result)
BASO STIPL BLD QL SMEAR: (no result)
BASOPHILS # BLD AUTO: 0.1 K/UL (ref 0–0.2)
BASOPHILS NFR BLD AUTO: 0.9 % (ref 0–2)
BUN SERPL-MCNC: 16 MG/DL (ref 7–18)
CABOT RINGS BLD QL SMEAR: (no result)
CALCIUM SERPL-MCNC: 8.5 MG/DL (ref 8.5–10.1)
CHLORIDE SERPL-SCNC: 102 MMOL/L (ref 98–107)
CO2 SERPL-SCNC: 26 MMOL/L (ref 21–32)
CREAT SERPL-MCNC: 0.9 MG/DL (ref 0.6–1.3)
DACRYOCYTES BLD QL SMEAR: (no result)
DOHLE BOD BLD QL SMEAR: (no result)
EOSINOPHIL # BLD AUTO: 0.3 K/UL (ref 0–0.7)
EOSINOPHIL NFR BLD AUTO: 4.7 % (ref 0–7)
ERYTHROCYTE [DISTWIDTH] IN BLOOD BY AUTOMATED COUNT: 13.2 % (ref 12.1–16.2)
GLUCOSE SERPL-MCNC: 99 MG/DL (ref 74–106)
HCT VFR BLD AUTO: 41.3 % (ref 36.7–47.1)
HELMET CELLS BLD QL SMEAR: (no result)
HGB BLD-MCNC: 13.9 G/DL (ref 12.5–16.3)
HOWELL-JOLLY BOD BLD QL SMEAR: (no result)
HYPOCHROMIA BLD QL: (no result)
LYMPHOCYTES # BLD AUTO: 2.5 K/UL (ref 0.8–4.8)
LYMPHOCYTES NFR BLD AUTO: 38.5 % (ref 20.5–51.5)
MACROCYTES BLD QL: (no result)
MANUAL DIF COMMENT BLD-IMP: 1
MCH RBC QN AUTO: 29.6 UUG (ref 23.8–33.4)
MCHC RBC AUTO-ENTMCNC: 34 G/DL (ref 32.5–36.3)
MCV RBC AUTO: 88 FL (ref 73–96.2)
MONOCYTES # BLD AUTO: 0.7 K/UL (ref 0.1–1.3)
MONOCYTES NFR BLD AUTO: 10.1 % (ref 0–11)
NEUTROPHILS # BLD AUTO: 3 K/UL (ref 1.8–8.9)
NEUTROPHILS NFR BLD AUTO: 45.8 % (ref 38.5–71.5)
NEUTS HYPERSEG # BLD: (no result) 10*3/UL
OVALOCYTES BLD QL SMEAR: (no result)
PAPPENHEIMER BOD BLD QL SMEAR: (no result)
PLATELET # BLD AUTO: 200 K/UL (ref 152–348)
POTASSIUM SERPL-SCNC: 3.6 MMOL/L (ref 3.5–5.1)
RBC # BLD AUTO: 4.69 MIL/UL (ref 4.06–5.63)
ROULEAUX BLD QL SMEAR: (no result)
SMUDGE CELLS BLD QL SMEAR: (no result)
SODIUM SERPL-SCNC: 138 MMOL/L (ref 136–145)
STOMATOCYTES BLD QL SMEAR: (no result)
TARGETS BLD QL SMEAR: (no result)
TOXIC GRANULES BLD QL SMEAR: (no result)
WBC # BLD AUTO: 6.5 K/UL (ref 3.6–10.2)

## 2025-06-13 RX ADMIN — VALSARTAN SCH MG: 80 TABLET ORAL at 08:24
